# Patient Record
Sex: FEMALE
[De-identification: names, ages, dates, MRNs, and addresses within clinical notes are randomized per-mention and may not be internally consistent; named-entity substitution may affect disease eponyms.]

---

## 2023-08-08 PROBLEM — Z00.00 ENCOUNTER FOR PREVENTIVE HEALTH EXAMINATION: Status: ACTIVE | Noted: 2023-08-08

## 2023-08-10 ENCOUNTER — APPOINTMENT (OUTPATIENT)
Age: 28
End: 2023-08-10
Payer: MEDICAID

## 2023-08-10 VITALS
DIASTOLIC BLOOD PRESSURE: 85 MMHG | HEIGHT: 63 IN | HEART RATE: 81 BPM | SYSTOLIC BLOOD PRESSURE: 120 MMHG | WEIGHT: 133 LBS | BODY MASS INDEX: 23.57 KG/M2

## 2023-08-10 DIAGNOSIS — Z78.9 OTHER SPECIFIED HEALTH STATUS: ICD-10-CM

## 2023-08-10 DIAGNOSIS — E04.1 NONTOXIC SINGLE THYROID NODULE: ICD-10-CM

## 2023-08-10 DIAGNOSIS — Z83.79 FAMILY HISTORY OF OTHER DISEASES OF THE DIGESTIVE SYSTEM: ICD-10-CM

## 2023-08-10 PROCEDURE — 99203 OFFICE O/P NEW LOW 30 MIN: CPT

## 2023-08-10 NOTE — ASSESSMENT
[FreeTextEntry1] : Impression: thyroid nodule - PE is normal. the nodule is not palpable on examination

## 2023-08-10 NOTE — CONSULT LETTER
[Dear  ___] : Dear  [unfilled], [Consult Letter:] : I had the pleasure of evaluating your patient, [unfilled]. [Please see my note below.] : Please see my note below. [Consult Closing:] : Thank you very much for allowing me to participate in the care of this patient.  If you have any questions, please do not hesitate to contact me. [Sincerely,] : Sincerely, [FreeTextEntry3] : Nomi Black MD, FACS

## 2023-08-10 NOTE — PHYSICAL EXAM
[Alert] : alert [Oriented to Person] : oriented to person [Oriented to Place] : oriented to place [Oriented to Time] : oriented to time [Calm] : calm [de-identified] : She  is alert, well-groomed, and in NAD   [de-identified] : anicteric.  Nasal mucosa pink, septum midline. Oral mucosa pink.  Tongue midline, Pharynx without exudates.   [de-identified] : Neck supple. Trachea midline. Thyroid isthmus barely palpable, lobes not felt. no palpable nodules

## 2023-08-10 NOTE — DATA REVIEWED
[FreeTextEntry1] : 	 Exam requested by: KARLENE MORALES MD [id number] [facility] [id number] SITE PERFORMED: [facility] SITE PHONE: [phone] Patient: LIBBY IVORY YOB: 1995 Phone: [phone] MRN: [id number] Acc: [id number] Date of Exam: 07-   EXAM:  ULTRASOUND THYROID NECK  HISTORY:  Thyromegaly  TECHNIQUE:  Real time sonographic imaging of the anterior neck is performed to evaluate the thyroid gland. High frequency linear array transducer is utilized to obtain grayscale and color Doppler images. Static images are provided for review.   COMPARISON:  None  FINDINGS:  The thyroid is of normal size.  The thyroid isthmus thickness is 0.2 cm.  The right lobe measures 3.9 x 0.8 x 1.4 cm in sagittal x AP x transverse dimensions.   The left lobe measures 4.0 x 1.1 x 1.4 cm in sagittal x AP x transverse dimensions.   Overall thyroid echotexture and blood flow: There is a normal thyroid echotexture, but reduced blood flow.  Thyroid nodule assessment:  We evaluate up to the four most suspicious nodules as per ACR recommendations. We use the following TI-RADS lexicon:  *Composition: Cystic, almost cystic, spongiform, mixed, almost solid, solid *Echotexture: Anechoic, isoechoic or hyperechoic, hypoechoic, very hypoechoic  *Shape: Wider than tall, taller than wide *Margins: Smooth, ill-defined, lobulated or irregular, extrathyroidal extension *Calcification: Macrocalcifications, peripheral rim calcification, punctate foci  Nodule #1: Series 1 image 45 Location: Posterior mid to upper pole left lobe Size: 0.7 x 0.3 x 0.6 cm Morphology: Solid, wider than tall, hypoechoic, lobulated margins, no calcifications  IMPRESSION: 1. The thyroid is of normal size and echotexture though hypovascular. 2. Single TI-RADS 4 nodule, detailed above. As there are no prior studies for comparison I recommend follow-up ultrasound surveillance in one year.    Thank you for the opportunity to participate in the care of this patient.     RENUKA SINGLETARY MD  - Electronically Signed: 07- 7:39 PM  Physician to Physician Direct Line is: [phone]

## 2023-08-10 NOTE — HISTORY OF PRESENT ILLNESS
[de-identified] : This is a 28 year  old patient who was referred by Dr. Ana Vieira with the chief complaint of having  a thyroid nodule.   Ms. IVORY  is s/p US thyroid on 07/27/2023  that showed Single TI-RADS 4 nodule. She denies any trauma to the area.   She denies any fever or  night sweats. Appetite is good and weight is stable.   She wants to know if it should be surgically  removed. she denies family history of thyroid  disease.